# Patient Record
Sex: FEMALE | Race: WHITE | NOT HISPANIC OR LATINO | Employment: FULL TIME | URBAN - METROPOLITAN AREA
[De-identification: names, ages, dates, MRNs, and addresses within clinical notes are randomized per-mention and may not be internally consistent; named-entity substitution may affect disease eponyms.]

---

## 2018-02-09 ENCOUNTER — NON-PROVIDER VISIT (OUTPATIENT)
Dept: OCCUPATIONAL MEDICINE | Facility: CLINIC | Age: 58
End: 2018-02-09
Payer: COMMERCIAL

## 2018-02-09 DIAGNOSIS — Z02.1 PRE-EMPLOYMENT DRUG SCREENING: ICD-10-CM

## 2018-02-09 LAB
AMP AMPHETAMINE: NORMAL
COC COCAINE: NORMAL
INT CON NEG: NORMAL
INT CON POS: NORMAL
MET METHAMPHETAMINES: NORMAL
OPI OPIATES: NORMAL
PCP PHENCYCLIDINE: NORMAL
POC DRUG COMMENT 753798-OCCUPATIONAL HEALTH: NORMAL
THC: NORMAL

## 2018-02-09 PROCEDURE — 80305 DRUG TEST PRSMV DIR OPT OBS: CPT | Performed by: PREVENTIVE MEDICINE

## 2021-08-02 ENCOUNTER — OFFICE VISIT (OUTPATIENT)
Dept: URGENT CARE | Facility: CLINIC | Age: 61
End: 2021-08-02
Payer: COMMERCIAL

## 2021-08-02 ENCOUNTER — HOSPITAL ENCOUNTER (OUTPATIENT)
Dept: RADIOLOGY | Facility: MEDICAL CENTER | Age: 61
End: 2021-08-02
Attending: STUDENT IN AN ORGANIZED HEALTH CARE EDUCATION/TRAINING PROGRAM
Payer: COMMERCIAL

## 2021-08-02 ENCOUNTER — HOSPITAL ENCOUNTER (OUTPATIENT)
Facility: MEDICAL CENTER | Age: 61
End: 2021-08-02
Attending: STUDENT IN AN ORGANIZED HEALTH CARE EDUCATION/TRAINING PROGRAM
Payer: COMMERCIAL

## 2021-08-02 VITALS
WEIGHT: 150 LBS | HEIGHT: 64 IN | OXYGEN SATURATION: 98 % | BODY MASS INDEX: 25.61 KG/M2 | TEMPERATURE: 98 F | HEART RATE: 70 BPM | DIASTOLIC BLOOD PRESSURE: 78 MMHG | SYSTOLIC BLOOD PRESSURE: 130 MMHG | RESPIRATION RATE: 16 BRPM

## 2021-08-02 DIAGNOSIS — R10.11 RIGHT UPPER QUADRANT ABDOMINAL PAIN: ICD-10-CM

## 2021-08-02 DIAGNOSIS — R10.11 RUQ PAIN: ICD-10-CM

## 2021-08-02 LAB
ALBUMIN SERPL BCP-MCNC: 4.2 G/DL (ref 3.2–4.9)
ALBUMIN/GLOB SERPL: 1.4 G/DL
ALP SERPL-CCNC: 82 U/L (ref 30–99)
ALT SERPL-CCNC: 36 U/L (ref 2–50)
ANION GAP SERPL CALC-SCNC: 12 MMOL/L (ref 7–16)
APPEARANCE UR: CLEAR
AST SERPL-CCNC: 29 U/L (ref 12–45)
BILIRUB SERPL-MCNC: 0.4 MG/DL (ref 0.1–1.5)
BILIRUB UR STRIP-MCNC: NORMAL MG/DL
BUN SERPL-MCNC: 15 MG/DL (ref 8–22)
CALCIUM SERPL-MCNC: 9.3 MG/DL (ref 8.5–10.5)
CHLORIDE SERPL-SCNC: 103 MMOL/L (ref 96–112)
CO2 SERPL-SCNC: 25 MMOL/L (ref 20–33)
COLOR UR AUTO: NORMAL
CREAT SERPL-MCNC: 0.68 MG/DL (ref 0.5–1.4)
GLOBULIN SER CALC-MCNC: 3 G/DL (ref 1.9–3.5)
GLUCOSE SERPL-MCNC: 132 MG/DL (ref 65–99)
GLUCOSE UR STRIP.AUTO-MCNC: NORMAL MG/DL
KETONES UR STRIP.AUTO-MCNC: NORMAL MG/DL
LEUKOCYTE ESTERASE UR QL STRIP.AUTO: NORMAL
LIPASE SERPL-CCNC: 36 U/L (ref 11–82)
NITRITE UR QL STRIP.AUTO: NORMAL
PH UR STRIP.AUTO: 5 [PH] (ref 5–8)
POTASSIUM SERPL-SCNC: 4.8 MMOL/L (ref 3.6–5.5)
PROT SERPL-MCNC: 7.2 G/DL (ref 6–8.2)
PROT UR QL STRIP: NORMAL MG/DL
RBC UR QL AUTO: NORMAL
SODIUM SERPL-SCNC: 140 MMOL/L (ref 135–145)
SP GR UR STRIP.AUTO: 1.01
UROBILINOGEN UR STRIP-MCNC: 0.2 MG/DL

## 2021-08-02 PROCEDURE — 80053 COMPREHEN METABOLIC PANEL: CPT

## 2021-08-02 PROCEDURE — 83690 ASSAY OF LIPASE: CPT

## 2021-08-02 PROCEDURE — 81002 URINALYSIS NONAUTO W/O SCOPE: CPT | Performed by: STUDENT IN AN ORGANIZED HEALTH CARE EDUCATION/TRAINING PROGRAM

## 2021-08-02 PROCEDURE — 76700 US EXAM ABDOM COMPLETE: CPT

## 2021-08-02 PROCEDURE — 99204 OFFICE O/P NEW MOD 45 MIN: CPT | Performed by: STUDENT IN AN ORGANIZED HEALTH CARE EDUCATION/TRAINING PROGRAM

## 2021-08-02 NOTE — LETTER
August 2, 2021       Patient: Lolly BURGOS   YOB: 1960   Date of Visit: 8/2/2021         To Whom It May Concern:    Lolly BURGOS is excused from work on 8/2/21 for medical reasons.    If you have any questions or concerns, please don't hesitate to call [phone]    Sincerely,    Kranthi Phipps D.O.  Electronically Signed

## 2021-08-02 NOTE — PROGRESS NOTES
Subjective:   CHIEF COMPLAINT  Chief Complaint   Patient presents with   • Abdominal Pain     x 4 days, cramps feeling    • Nausea       HPI  Lloly BURGOS is a 61 y.o. female who presents with a chief complaint of right-sided abdominal discomfort, nausea and intermittent diaphoresis for approximately 7 days. The patient points to the right upper quadrant as a source of discomfort.  Describes symptoms as crampy discomfort that can last for approximately 30 minutes and radiates proximally up into her throat.  There are no specific triggers but says the symptoms are aggravated with standing on concrete surface for extended periods while at work.  Says the symptoms improve when sitting straight.  She has been tolerating food and symptoms do not seem to be aggravated with p.o. intake.  Admits associated symptoms of constipation.  The patient has never had a colonoscopy.    REVIEW OF SYSTEMS  General: no fever or chills  GI: no nausea or vomiting  See HPI for further details.    PAST MEDICAL HISTORY  Patient Active Problem List    Diagnosis Date Noted   • Otitis media 04/18/2011       SURGICAL HISTORY   has a past surgical history that includes appendectomy; primary c section; and lumpectomy.    ALLERGIES  Allergies   Allergen Reactions   • Pcn [Penicillins]        CURRENT MEDICATIONS  Home Medications     Reviewed by Kranthi Phipps D.O. (Physician) on 08/02/21 at 0934  Med List Status: <None>   Medication Last Dose Status   hydrocodone/acetaminophen (NORCO)  MG TABS Not Taking Active   meclizine (ANTIVERT) 25 MG TABS Not Taking Active                SOCIAL HISTORY  Social History     Tobacco Use   • Smoking status: Former Smoker     Packs/day: 1.00   • Smokeless tobacco: Never Used   Vaping Use   • Vaping Use: Every day   Substance and Sexual Activity   • Alcohol use: Not Currently     Comment: quit couple months ago    • Drug use: Not Currently   • Sexual activity: Not on file       FAMILY HISTORY  No  "family history on file.       Objective:   PHYSICAL EXAM  VITAL SIGNS: /78   Pulse 70   Temp 36.7 °C (98 °F) (Temporal)   Resp 16   Ht 1.626 m (5' 4\")   Wt 68 kg (150 lb)   SpO2 98%   Breastfeeding No   BMI 25.75 kg/m²     Gen: no acute distress, normal voice  Skin: dry, intact, moist mucosal membranes  Lungs: CTAB w/ symmetric expansion  CV: RRR w/o murmurs or clicks  Abdomen: Bowel sounds normal, soft, mild TTP RUQ w/o rebound or guarding.  No tenderness with straight leg raise  Psych: normal affect, normal judgement, alert, awake    UA: Moderate leukocytes.  No blood or nitrates.      RADIOLOGY RESULTS   US-ABDOMEN COMPLETE SURVEY    Result Date: 8/2/2021 8/2/2021 3:54 PM HISTORY/REASON FOR EXAM:  Pain; r/o cholelithiasis Pain TECHNIQUE/EXAM DESCRIPTION AND NUMBER OF VIEWS:  Complete abdomen survey. COMPARISON: None FINDINGS: The liver is normal in contour. There is no evidence of solid mass lesion. The liver measures 12.9 cm. The gallbladder is normal. There is no evidence of cholelithiasis. The gallbladder wall thickness measures 0.2 cm. There is no pericholecystic fluid. The common duct measures 0.6 cm. The visualized pancreas is unremarkable. The visualized aorta is normal in caliber. Intrahepatic IVC is patent. The portal vein is patent with hepatopetal flow. The MPV measures 0.7 cm. The right kidney measures 10.0 cm. The left kidney measures 11.1 cm. There is no hydronephrosis. The spleen measures 8.9 cm maximally. The bladder demonstrates no focal wall abnormality. There is no ascites. There is a 4.7 x 5.5 x 4.8 cm heterogeneous mass within the uterus.     No gallstones or gallbladder wall thickening. No dilatation of the common duct. No hydronephrosis. No free fluid. 5.5 cm uterine mass most likely a fibroid.             Assessment/Plan:     1. RUQ pain  US-ABDOMEN COMPLETE SURVEY    Comp Metabolic Panel    LIPASE    AMB REFERRAL BACK TO [facility]CP    POCT Urinalysis   2. Right upper " quadrant abdominal pain     Symptoms were suggestive of symptomatic cholelithiasis.  No peritoneal signs.    Offered to work-up the patient through urgent care vs sending to the emergency; the patient elected the former which is reasonable.  Lipase and CMP were ordered and unremarkable.  Additionally I ordered an RUQ abdominal ultrasound which demonstrated a 5.5 cm uterine fibroid without evidence of gallstones or gallbladder wall thickening.  I contacted the patient and spoke with her discussing the findings over the phone.  It is possible the symptoms are due to the fibroid.  The patient does report it has been quite sometime since she has had a physical exam.  A referral to establish with primary care was submitted.  I instructed her to try taking ibuprofen 800 mg 3 times daily as needed.  If any point the symptoms worsen or if they do not seem to improve within 24 hours I instructed her to go to emergency room for further evaluation and treatment.  The patient verbalized her understanding and all questions were answered.    Differential diagnosis, natural history, supportive care, and indications for immediate follow-up discussed. All questions answered. Patient agrees with the plan of care.    Follow-up as needed if symptoms worsen or fail to improve to PCP, Urgent care or Emergency Room.    Greater than 45 minutes was spent on this encounter including face-to-face time, discussing the diagnosis, medical management, follow-up, emergency room precautions and charting. This does not include time spent on separately billable procedures/tests.    Please note that this dictation was created using voice recognition software. I have made a reasonable attempt to correct obvious errors, but I expect that there are errors of grammar and possibly content that I did not discover before finalizing the note.